# Patient Record
Sex: FEMALE | Race: ASIAN | NOT HISPANIC OR LATINO | URBAN - METROPOLITAN AREA
[De-identification: names, ages, dates, MRNs, and addresses within clinical notes are randomized per-mention and may not be internally consistent; named-entity substitution may affect disease eponyms.]

---

## 2020-09-11 ENCOUNTER — TELEPHONE ENCOUNTER (OUTPATIENT)
Dept: URBAN - METROPOLITAN AREA CLINIC 92 | Facility: CLINIC | Age: 20
End: 2020-09-11

## 2020-09-11 RX ORDER — DICYCLOMINE HYDROCHLORIDE 10 MG/1
1 TABLET CAPSULE ORAL THREE TIMES A DAY
Qty: 90 | Refills: 3 | OUTPATIENT
Start: 2020-09-11 | End: 2021-01-08

## 2020-09-11 RX ORDER — RABEPRAZOLE SODIUM 20 MG/1
1 TABLET TABLET, DELAYED RELEASE ORAL ONCE A DAY
Qty: 30 TABLET | Refills: 4 | OUTPATIENT
Start: 2020-09-11

## 2021-01-06 ENCOUNTER — TELEPHONE ENCOUNTER (OUTPATIENT)
Dept: URBAN - METROPOLITAN AREA CLINIC 92 | Facility: CLINIC | Age: 21
End: 2021-01-06

## 2021-01-12 ENCOUNTER — OFFICE VISIT (OUTPATIENT)
Dept: URBAN - METROPOLITAN AREA CLINIC 98 | Facility: CLINIC | Age: 21
End: 2021-01-12
Payer: COMMERCIAL

## 2021-01-12 ENCOUNTER — DASHBOARD ENCOUNTERS (OUTPATIENT)
Age: 21
End: 2021-01-12

## 2021-01-12 VITALS
HEART RATE: 70 BPM | WEIGHT: 96.2 LBS | BODY MASS INDEX: 18.89 KG/M2 | SYSTOLIC BLOOD PRESSURE: 96 MMHG | HEIGHT: 60 IN | DIASTOLIC BLOOD PRESSURE: 66 MMHG | TEMPERATURE: 96.2 F

## 2021-01-12 DIAGNOSIS — R19.5 LOOSE BOWEL MOVEMENT: ICD-10-CM

## 2021-01-12 DIAGNOSIS — R10.9 ABDOMINAL DISCOMFORT: ICD-10-CM

## 2021-01-12 DIAGNOSIS — K58.2 IRRITABLE BOWEL SYNDROME WITH BOTH CONSTIPATION AND DIARRHEA: ICD-10-CM

## 2021-01-12 PROBLEM — 10743008: Status: ACTIVE | Noted: 2021-01-12

## 2021-01-12 PROCEDURE — 1036F TOBACCO NON-USER: CPT | Performed by: INTERNAL MEDICINE

## 2021-01-12 PROCEDURE — 99214 OFFICE O/P EST MOD 30 MIN: CPT | Performed by: INTERNAL MEDICINE

## 2021-01-12 PROCEDURE — G8420 CALC BMI NORM PARAMETERS: HCPCS | Performed by: INTERNAL MEDICINE

## 2021-01-12 RX ORDER — SACCHAROMYCES BOULARDII 250 MG
CAPSULE ORAL
Qty: 0 | Refills: 0 | Status: ACTIVE | COMMUNITY
Start: 1900-01-01

## 2021-01-12 RX ORDER — DICYCLOMINE HYDROCHLORIDE 20 MG/2ML
INJECTION, SOLUTION INTRAMUSCULAR
Qty: 0 | Refills: 0 | Status: ACTIVE | COMMUNITY
Start: 1900-01-01

## 2021-01-12 RX ORDER — RABEPRAZOLE SODIUM 20 MG/1
1 TABLET TABLET, DELAYED RELEASE ORAL ONCE A DAY
Qty: 30 TABLET | Refills: 4 | Status: ACTIVE | COMMUNITY
Start: 2020-09-11

## 2021-01-12 NOTE — HPI-TODAY'S VISIT:
Started in March with loose BM. More frequent BM 2-3 per day. Occasional loose BM- more with cheese. Slightly improved over the summer and went off dairy. August- back on meal plan and then started having more inssues with pain in the left quadrant. Episodic pain and discomfort. Lasted for an hour or so. More after eating. 30-45 mins after eating. NO nausea or vomiting with pain. DId have more bloating as well. Been on floarstor BID for 2- 3 weeks. Went to ER- September- CT and labs, pelvic US and was normal. Loss of appetite- some but not much weight loss. NO reflux or heartburn. Did have some bright red bleeding with BM- was going on for 3 weeks. No IBD in the family. Inflammtory markers were normal. OCP - [facility] this year. Did try bentyl - did not help much. celiac panel was normal  Present- Still with fluctuating loose BM- will have BM after eating and abdominal discomfort.  Alternating with constipation.  Does still get bleeding with wiping.  Tried dicyclomine and rabeprazole. Using IBGard- seemed to help more  No weight loss. Tried low FODMAP diet as well.  On OCP- seen by Gyn

## 2021-01-25 ENCOUNTER — TELEPHONE ENCOUNTER (OUTPATIENT)
Dept: URBAN - METROPOLITAN AREA CLINIC 98 | Facility: CLINIC | Age: 21
End: 2021-01-25

## 2021-01-29 ENCOUNTER — TELEPHONE ENCOUNTER (OUTPATIENT)
Dept: URBAN - METROPOLITAN AREA CLINIC 98 | Facility: CLINIC | Age: 21
End: 2021-01-29

## 2021-07-29 ENCOUNTER — OFFICE VISIT (OUTPATIENT)
Dept: URBAN - METROPOLITAN AREA CLINIC 98 | Facility: CLINIC | Age: 21
End: 2021-07-29